# Patient Record
Sex: OTHER/UNKNOWN | Race: WHITE | Employment: FULL TIME | URBAN - METROPOLITAN AREA
[De-identification: names, ages, dates, MRNs, and addresses within clinical notes are randomized per-mention and may not be internally consistent; named-entity substitution may affect disease eponyms.]

---

## 2017-01-30 ENCOUNTER — OFFICE VISIT (OUTPATIENT)
Dept: RHEUMATOLOGY | Age: 32
End: 2017-01-30

## 2017-01-30 VITALS
OXYGEN SATURATION: 97 % | BODY MASS INDEX: 33.03 KG/M2 | SYSTOLIC BLOOD PRESSURE: 138 MMHG | HEIGHT: 73 IN | DIASTOLIC BLOOD PRESSURE: 79 MMHG | HEART RATE: 100 BPM | TEMPERATURE: 98.1 F | WEIGHT: 249.2 LBS

## 2017-01-30 DIAGNOSIS — D86.9 SARCOID: Primary | ICD-10-CM

## 2017-01-30 RX ORDER — MYCOPHENOLATE MOFETIL 500 MG/1
500 TABLET ORAL 2 TIMES DAILY
Qty: 60 TAB | Refills: 6 | Status: SHIPPED | OUTPATIENT
Start: 2017-01-30 | End: 2017-03-01

## 2017-01-30 NOTE — PROGRESS NOTES
RHEUMATOLOGY PROBLEM LIST AND CHIEF COMPLAINT  1. Sarcoidosis - lymph node biopsy showing nonnecrotizing granuloma, arthralgia, myalgia, chest pain, bone pain, hilar adenopathy, elevated ACE level, chronic abdominal pain with digestive issues. Therapy History:  Prior NSAIDs:   Prior DMARDs: methotrexate (AEs), hydroxychloroquine, ACTH (stopped 09/2016), Imuran, Remicade (7/13/2015 - 3/29/2016, ineffective)   Current NSAIDs:  Current DMARDs: Prednisone    INTERVAL HISTORY  This is a 33 y.o.  male. Today, the patient complains of pain in the joints and muscles. Severity:  4 on a scale of 0-10. Timing: all day or on awakening   Context/Associated signs and symptoms: The patient has restarted Acthar twice a week and feels \"decent. \" He complains of \"whole body pain,\" arthralgia, and occasional fatigue, but notes that myalgia, lower back pain, and chest pain has improved. He reports that every few weeks he will feel very fatigued and need to sleep for one day, but otherwise this has improved. He continues on prednisone 5 mg daily and denies seeing endocrinology. RHEUMATOLOGY REVIEW OF SYSTEMS   Positives as per history  Negatives as follows:  Rahel Mcer:  Denies unexplained persistent fevers or weight change  RESPIRATORY:  No pleuritic pain, exertional dyspnea  CARDIOVASCULAR:  Denies chest pain  GASTRO:   Denies heartburn, abdominal pain, nausea, vomiting, diarrhea  SKIN:    Denies rash   MSK:    No morning stiffness >1 hour    PAST MEDICAL HISTORY  Reviewed with patient, significant changes in medical history - no     PHYSICAL EXAM  Blood pressure 138/79, pulse 100, temperature 98.1 °F (36.7 °C), temperature source Oral, height 6' 1\" (1.854 m), weight 249 lb 3.2 oz (113 kg), SpO2 97 %. GENERAL APPEARANCE: Well-nourished, no acute distress  NECK: No adenopathy  ENT: No oral ulcers  CARDIOVASCULAR: Heart rhythm is regular. No murmur, rub, gallop  CHEST: Normal vesicular breath sounds.  No wheezes, rales, pleural friction rubs  ABDOMINAL: The abdomen is soft and nontender. Bowel sounds are normal  SKIN: No rash, palpable purpura, digital ulcer, abnormal thickening   MUSCULOSKELETAL:   Upper extremities - Right wrist minimal swelling and mild warmth  Lower extremities - full range of motion, no tenderness, no swelling, no synovial thickening and no deformity of joints      LABS, RADIOLOGY AND PROCEDURES  Previous labs reviewed -Yes    10/28/2016 Labs  ALT(86) - elevated  ESR(16) - mildly elevated  CBC, CMP, CRP, ACE - normal    10/28/2016 Chest x-ray  IMPRESSION:  No acute process. No interval change. ASSESSMENT  1. Sarcoidosis (Established problem -  Progressive disease) - the patient has restarted Acthar twice a week along with his prednisone 5 mg daily. He continues to have warmth and swelling in his right wrist, though there is improvement in the warmth. I explained that since Acthar has provided relief but we should start him on Cellcept so we can eventually stop prednisone and acthar. He should start Cellcept 500 mg daily since I am afraid he may have adverse effects. If tolerable he should then titrate his Cellcept to 500 mg BID. I explained that if Cellcept is effective in treating his wrist, we will begin to taper his Acthar. For now, he should continue Acthar twice a week and prednisone 5 mg daily. He should return in 2 months for a follow up. 2. New medication - Mycophenolate mofetil - A written summary, as prepared by the [employer] of Rheumatology was provided. The patient was given the opportunity to ask questions, and verbalized understanding of the following: The most common side effects reported were those associated with upset stomach, nausea, vomiting or diarrhea. Other possible side effects include headaches, dizziness, difficulty sleeping, tremor and rash. These side effects do not persist usually.   Less common include blood count abnormalities; reduction of white blood cells, red blood cells and platelets. Because of this, chances of infections, anemia, GI bleeding is increased. In transplant patients there was an increase risk of some cancers such as lymphoma and skin cancer. Regular blood tests are necessary for monitoring of possibly toxicity. Pregnancy is contraindicating when using this medication. Live vaccine should be avoided. Direct prolonged sun exposure should also be avoided. PLAN  1. Acthar 80 units twice a week  2. Continue prednisone 5mg daily  3. Start Cellcept 500 mg daily, titrate to 500 mg BID if tolerable  4. Return in 2 months    Bharati Remy MD, [address] [place] [address]   Adult and Pediatric Rheumatology     [facility] [address], Phone [phone], Fax [phone]     Visiting  of Pediatrics    Department of Pediatrics[address], Phone [phone], Fax [phone]    cc:  Santy Thomas MD    Written by stanislaw Church, as dictated by Bre Bernal.  Minda Remy M.D.

## 2017-01-30 NOTE — MR AVS SNAPSHOT
Visit Information     Date & Time Provider Department Dept. Phone Encounter #    1/30/2017  3:30 PM Roger Huber MD Arthritis and [address] [place] [address] [phone] [id number]      Follow-up Instructions     Return in about 2 months (around 3/30/2017). Your Appointments     2/10/2017  3:15 PM   ACUTE CARE with Miky Baez MD   [address])   Appt Note: for pain med. [address] Maintenance        Date Due    DTaP/Tdap/Td series (1 - Tdap) 3/14/2004      Allergies as of 1/30/2017  Review Complete On: 1/30/2017 By: Raymond Mendiola LPN    No Known Allergies      Current Immunizations  Reviewed on 3/29/2016    Name Date    Influenza Vaccine Intradermal PF 10/10/2014       Not reviewed this visit   You Were Diagnosed With        Codes Comments    [facility]    -  Primary ICD-10-CM: D86.9  ICD-9-CM: 135       Vitals     BP Pulse Temp Height(growth percentile) Weight(growth percentile) SpO2    138/79 (BP 1 Location: Right arm, BP Patient Position: Sitting) 100 98.1 °F (36.7 °C) (Oral) 6' 1\" (1.854 m) 249 lb 3.2 oz (113 kg) 97%    BMI Smoking Status                32.88 kg/m2 Never Smoker        Vitals History      BMI and BSA Data     Body Mass Index Body Surface Area    32.88 kg/m 2 2.41 m 2         Preferred Pharmacy       Pharmacy Name Phone    [facility]/PHARMACY [address], [address] [phone]         Your Updated Medication List          This list is accurate as of: 1/30/17  3:56 PM.  Always use your most recent med list.                Thomas Dawkins H.P. 80 unit/mL injectable gel   Generic drug:  corticotropin   INJECT 1 ML (80 UNITS) SUBCUTANEOUSLY TWICE PER WEEK. STORE REFRIGERATED. MULTI-DOSE VIAL. DISCARD UNUSED PORTION 28 DAYS AFTER INITIAL USE.        ALPRAZolam 0.5 mg tablet   Commonly known as:  XANAX   Take 1 Tab by mouth two (2) times daily as needed for Anxiety. B-D Syringe Luer-Patito 1cc 1 mL Syrg   Generic drug:  Syringe (Disposable)   USE AS DIRECTED       calcium 500 mg Tab   Take 1 tablet by mouth daily. chlorthalidone 25 mg tablet   Commonly known as:  HYGROTEN   Take 1 Tab by mouth daily. cholecalciferol 1,000 unit Cap   Commonly known as:  VITAMIN D3   TAKE ONE CAPSULE BY MOUTH EVERY DAY       Disposable Needles 25 x 5/8 \" Ndle   Generic drug:  Needle (Disp) 25 G   USE AS DIRECTED       mycophenolate 500 mg tablet   Commonly known as:  CELLCEPT   Take 1 Tab by mouth two (2) times a day for 30 days. * oxyCODONE ER 10 mg ER tablet   Commonly known as:  OxyCONTIN   Take 1 Tab by mouth every twelve (12) hours. Max Daily Amount: 20 mg.       * oxyCODONE 5 mg capsule   Commonly known as:  OXYIR   Take 1 Cap by mouth every four (4) hours as needed. Max Daily Amount: 30 mg.       pantoprazole 40 mg tablet   Commonly known as:  PROTONIX   Take 1 Tab by mouth daily. predniSONE 5 mg tablet   Commonly known as:  DELTASONE   take 7.5 tablets by mouth daily       rOPINIRole 1 mg tablet   Commonly known as:  REQUIP   Take 1 Tab by mouth nightly. * Notice: This list has 2 medication(s) that are the same as other medications prescribed for you. Read the directions carefully, and ask your doctor or other care provider to review them with you. Prescriptions Sent to Pharmacy        Refills    mycophenolate (CELLCEPT) 500 mg tablet 6    Sig: Take 1 Tab by mouth two (2) times a day for 30 days. Class: Normal    Pharmacy: [facility]/pharmacy [id number] Ph #: [phone]    Route: Oral      Follow-up Instructions     Return in about 2 months (around 3/30/2017). Introducing [facility]!      Vahe Lieberman introduces Audioscribe patient portal. Now you can access parts of your medical record, email your doctor's office, and request medication refills online. 1. In your internet browser, go to [email or web address]   2. Click on the First Time User? Click Here link in the Sign In box. You will see the New Member Sign Up page. 3. Enter your So[employer]ets Access Code exactly as it appears below. You will not need to use this code after youve completed the sign-up process. If you do not sign up before the expiration date, you must request a new code. · Soapets Access Code: [email or web address]  Expires: 4/30/2017  3:56 PM    4. Enter the last four digits of your Social Security Number (xxxx) and Date of Birth (mm/dd/yyyy) as indicated and click Submit. You will be taken to the next sign-up page. 5. Create a Soapets ID. This will be your Soapets login ID and cannot be changed, so think of one that is secure and easy to remember. 6. Create a Soapets password. You can change your password at any time. 7. Enter your Password Reset Question and Answer. This can be used at a later time if you forget your password. 8. Enter your e-mail address. You will receive e-mail notification when new information is available in [address] 9. Click Sign Up. You can now view and download portions of your medical record. 10. Click the Download Summary menu link to download a portable copy of your medical information. If you have questions, please visit the Frequently Asked Questions section of the So[employer]ets website. Remember, So[employer]ets is NOT to be used for urgent needs. For medical emergencies, dial 911. Now available from your iPhone and Android! Please provide this summary of care documentation to your next provider. Your primary care clinician is listed as Selene Qiu. If you have any questions after today's visit, please call [phone].

## 2017-02-10 ENCOUNTER — HOSPITAL ENCOUNTER (EMERGENCY)
Age: 32
Discharge: HOME | End: 2017-02-10
Attending: EMERGENCY MEDICINE
Payer: COMMERCIAL

## 2017-02-10 ENCOUNTER — APPOINTMENT (OUTPATIENT)
Dept: GENERAL RADIOLOGY | Age: 32
End: 2017-02-10
Attending: EMERGENCY MEDICINE
Payer: COMMERCIAL

## 2017-02-10 VITALS
OXYGEN SATURATION: 96 % | HEART RATE: 83 BPM | RESPIRATION RATE: 18 BRPM | WEIGHT: 250 LBS | HEIGHT: 72 IN | SYSTOLIC BLOOD PRESSURE: 139 MMHG | BODY MASS INDEX: 33.86 KG/M2 | DIASTOLIC BLOOD PRESSURE: 53 MMHG | TEMPERATURE: 98.3 F

## 2017-02-10 DIAGNOSIS — F43.21 GRIEF REACTION: ICD-10-CM

## 2017-02-10 DIAGNOSIS — F41.1 ANXIETY STATE: Primary | ICD-10-CM

## 2017-02-10 LAB
ALBUMIN SERPL BCP-MCNC: 4.6 G/DL (ref 3.5–5)
ALBUMIN/GLOB SERPL: 1.4 {RATIO} (ref 1.1–2.2)
ALP SERPL-CCNC: 52 U/L (ref 45–117)
ALT SERPL-CCNC: 113 U/L (ref 12–78)
ANION GAP BLD CALC-SCNC: 10 MMOL/L (ref 5–15)
AST SERPL W P-5'-P-CCNC: 36 U/L (ref 15–37)
BASOPHILS # BLD AUTO: 0.1 K/UL (ref 0–0.1)
BASOPHILS # BLD: 1 % (ref 0–1)
BILIRUB SERPL-MCNC: 0.5 MG/DL (ref 0.2–1)
BUN SERPL-MCNC: 16 MG/DL (ref 6–20)
BUN/CREAT SERPL: 14 (ref 12–20)
CALCIUM SERPL-MCNC: 9.5 MG/DL (ref 8.5–10.1)
CHLORIDE SERPL-SCNC: 104 MMOL/L (ref 97–108)
CO2 SERPL-SCNC: 26 MMOL/L (ref 21–32)
CREAT SERPL-MCNC: 1.17 MG/DL (ref 0.7–1.3)
EOSINOPHIL # BLD: 0.2 K/UL (ref 0–0.4)
EOSINOPHIL NFR BLD: 3 % (ref 0–7)
ERYTHROCYTE [DISTWIDTH] IN BLOOD BY AUTOMATED COUNT: 12.2 % (ref 11.5–14.5)
GLOBULIN SER CALC-MCNC: 3.4 G/DL (ref 2–4)
GLUCOSE SERPL-MCNC: 99 MG/DL (ref 65–100)
HCT VFR BLD AUTO: 44.9 % (ref 36.6–50.3)
HGB BLD-MCNC: 16.2 G/DL (ref 12.1–17)
LYMPHOCYTES # BLD AUTO: 31 % (ref 12–49)
LYMPHOCYTES # BLD: 2.3 K/UL (ref 0.8–3.5)
MCH RBC QN AUTO: 31.6 PG (ref 26–34)
MCHC RBC AUTO-ENTMCNC: 36.1 G/DL (ref 30–36.5)
MCV RBC AUTO: 87.5 FL (ref 80–99)
MONOCYTES # BLD: 0.7 K/UL (ref 0–1)
MONOCYTES NFR BLD AUTO: 9 % (ref 5–13)
NEUTS SEG # BLD: 4.1 K/UL (ref 1.8–8)
NEUTS SEG NFR BLD AUTO: 56 % (ref 32–75)
PLATELET # BLD AUTO: 188 K/UL (ref 150–400)
POTASSIUM SERPL-SCNC: 3.4 MMOL/L (ref 3.5–5.1)
PROT SERPL-MCNC: 8 G/DL (ref 6.4–8.2)
RBC # BLD AUTO: 5.13 M/UL (ref 4.1–5.7)
SODIUM SERPL-SCNC: 140 MMOL/L (ref 136–145)
TROPONIN I SERPL-MCNC: <0.04 NG/ML
WBC # BLD AUTO: 7.3 K/UL (ref 4.1–11.1)

## 2017-02-10 PROCEDURE — 36415 COLL VENOUS BLD VENIPUNCTURE: CPT | Performed by: EMERGENCY MEDICINE

## 2017-02-10 PROCEDURE — 99284 EMERGENCY DEPT VISIT MOD MDM: CPT

## 2017-02-10 PROCEDURE — 85025 COMPLETE CBC W/AUTO DIFF WBC: CPT | Performed by: EMERGENCY MEDICINE

## 2017-02-10 PROCEDURE — 84484 ASSAY OF TROPONIN QUANT: CPT | Performed by: EMERGENCY MEDICINE

## 2017-02-10 PROCEDURE — 93005 ELECTROCARDIOGRAM TRACING: CPT

## 2017-02-10 PROCEDURE — 80053 COMPREHEN METABOLIC PANEL: CPT | Performed by: EMERGENCY MEDICINE

## 2017-02-10 PROCEDURE — 71020 XR CHEST PA LAT: CPT

## 2017-02-10 RX ORDER — ALPRAZOLAM 0.5 MG/1
0.5 TABLET ORAL
Qty: 10 TAB | Refills: 0 | Status: SHIPPED | OUTPATIENT
Start: 2017-02-10 | End: 2019-05-20

## 2017-02-11 LAB
ATRIAL RATE: 73 BPM
CALCULATED P AXIS, ECG09: 20 DEGREES
CALCULATED R AXIS, ECG10: 14 DEGREES
CALCULATED T AXIS, ECG11: 25 DEGREES
DIAGNOSIS, 93000: NORMAL
P-R INTERVAL, ECG05: 138 MS
Q-T INTERVAL, ECG07: 404 MS
QRS DURATION, ECG06: 98 MS
QTC CALCULATION (BEZET), ECG08: 445 MS
VENTRICULAR RATE, ECG03: 73 BPM

## 2017-02-11 NOTE — ED PROVIDER NOTES
HPI Comments: Graciela Le is a 33 y.o. male with a history of hypertension, sarcoidosis, anxiety, and depression who presents via EMS to [facility] ED with a chief complaint of anxiety with onset immediately prior to arrival in the ED, with patient reporting he \"lost composure\" while driving due to the sudden death of his wife last night. Patient states he has been with his family since last night until this afternoon, at which time he decided to drive to his parent's house but was unable to handle the drive due to his emotional state. Patient reports pulling over and calling a friend, at which time he began to feel lightheaded with chest heaviness and generalized numbness over his whole body. Patient states he has not needed medication for anxiety or depression in a number of years, but is still taking pain medications for sarcoidosis. Patient denies any current symptoms, reporting he just feels \"extreme sadness\" over the death of his wife. Patient denies any suicidal or homicidal ideations, abdominal pain, nausea, vomiting, diarrhea, cough, or any other symptoms at this time. PCP: Robert Yates MD    There are no other complaints, changes or physical findings at this time. The history is provided by the patient. Past Medical History:   Diagnosis Date    Depression     Essential hypertension     Long-term current use of steroids     [facility] ([facility]        History reviewed. No pertinent past surgical history. Family History:   Problem Relation Age of Onset    Cancer Father      testicular       Social History     Social History    Marital status:      Spouse name: N/A    Number of children: N/A    Years of education: N/A     Occupational History    Not on file.      Social History Main Topics    Smoking status: Never Smoker    Smokeless tobacco: Never Used    Alcohol use No    Drug use: No    Sexual activity: Yes     Partners: Female     Birth control/ protection: None Other Topics Concern    Not on file     Social History Narrative    Is a , works with [employer]. Lives with his wife and their 10year old daughter. ALLERGIES: Review of patient's allergies indicates no known allergies. Review of Systems   Constitutional: Negative. Negative for chills, fatigue and fever. HENT: Negative. Negative for congestion, rhinorrhea and sore throat. Eyes: Negative. Negative for pain, discharge and visual disturbance. Respiratory: Negative. Negative for cough, chest tightness, shortness of breath and wheezing. Cardiovascular: Positive for chest pain (heaviness). Negative for palpitations and leg swelling. Gastrointestinal: Negative. Negative for abdominal pain, constipation, diarrhea, nausea and vomiting. Endocrine: Negative. Genitourinary: Negative. Negative for dysuria, frequency and hematuria. Musculoskeletal: Negative. Negative for arthralgias, back pain and myalgias. Skin: Negative. Negative for rash. Allergic/Immunologic: Negative. Neurological: Positive for light-headedness and numbness. Negative for dizziness, weakness and headaches. Hematological: Negative. Psychiatric/Behavioral: Negative for suicidal ideas (or homicidal). The patient is nervous/anxious. All other systems reviewed and are negative. Patient Vitals for the past 12 hrs:   Temp Pulse Resp BP SpO2   02/10/17 2100 - - - 139/53 96 %   02/10/17 2000 - - - 133/76 99 %   02/10/17 1913 98.3 °F (36.8 °C) 83 18 (!) 137/91 93 %      Physical Exam   Constitutional: He is oriented to person, place, and time. He appears well-developed and well-nourished. No distress. HENT:   Head: Normocephalic and atraumatic. Eyes: EOM are normal. Right eye exhibits no discharge. Left eye exhibits no discharge. No scleral icterus. Neck: Normal range of motion. Neck supple. No tracheal deviation present.    Cardiovascular: Normal rate, regular rhythm, normal heart sounds and intact distal pulses. Exam reveals no gallop and no friction rub. No murmur heard. Pulmonary/Chest: Effort normal and breath sounds normal. No respiratory distress. He has no wheezes. He has no rales. He exhibits tenderness (anterior). Abdominal: Soft. He exhibits no distension. There is no tenderness. Musculoskeletal: Normal range of motion. He exhibits no edema. Lymphadenopathy:     He has no cervical adenopathy. Neurological: He is alert and oriented to person, place, and time. Skin: Skin is warm and dry. No rash noted. Psychiatric: His mood appears anxious. Tearful   Nursing note and vitals reviewed. MDM  Number of Diagnoses or Management Options  Anxiety state:   Grief reaction:   Diagnosis management comments:     Patient presents to ED with lightheadedness, generalized numbness and chest pressure likely secondary to anxiety and panic attack associated with sudden death of wife last night. Patient denies SI/HI. Chest wall pain is reproducible on exam and likely MSK in nature. Doubt ACS. Will check CBC, CMP, Stephanie and CXR.  paged to provide emotional support. Amount and/or Complexity of Data Reviewed  Clinical lab tests: ordered and reviewed  Tests in the radiology section of CPT®: ordered and reviewed  Tests in the medicine section of CPT®: ordered and reviewed  Review and summarize past medical records: yes  Independent visualization of images, tracings, or specimens: yes    Patient Progress  Patient progress: stable      Procedures     EKG interpretation: (Preliminary) 19:43  Rhythm: normal sinus rhythm; and regular. Rate (approx.): 73; Axis: normal; WI interval: normal; QRS interval: normal; ST/T wave: normal.    Progress Note:  8:58 PM  Patient reevaluated. He has no complaints at this time and continues to deny SI/HI. Parents are at bedside and will be taking patient home with them. Patient and parents are comfortable with plan for discharge. Discussed results, prescriptions and follow up plan with patient. Provided customary return to ED instructions. Patient expressed understanding. Tori Sebastian MD    LABORATORY TESTS:  Recent Results (from the past 12 hour(s))   CBC WITH AUTOMATED DIFF    Collection Time: 02/10/17  7:53 PM   Result Value Ref Range    WBC 7.3 4.1 - 11.1 K/uL    RBC 5.13 4.10 - 5.70 M/uL    HGB 16.2 12.1 - 17.0 g/dL    HCT 44.9 36.6 - 50.3 %    MCV 87.5 80.0 - 99.0 FL    MCH 31.6 26.0 - 34.0 PG    MCHC 36.1 30.0 - 36.5 g/dL    RDW 12.2 11.5 - 14.5 %    PLATELET 073 130 - 606 K/uL    NEUTROPHILS 56 32 - 75 %    LYMPHOCYTES 31 12 - 49 %    MONOCYTES 9 5 - 13 %    EOSINOPHILS 3 0 - 7 %    BASOPHILS 1 0 - 1 %    ABS. NEUTROPHILS 4.1 1.8 - 8.0 K/UL    ABS. LYMPHOCYTES 2.3 0.8 - 3.5 K/UL    ABS. MONOCYTES 0.7 0.0 - 1.0 K/UL    ABS. EOSINOPHILS 0.2 0.0 - 0.4 K/UL    ABS. BASOPHILS 0.1 0.0 - 0.1 K/UL   METABOLIC PANEL, COMPREHENSIVE    Collection Time: 02/10/17  7:53 PM   Result Value Ref Range    Sodium 140 136 - 145 mmol/L    Potassium 3.4 (L) 3.5 - 5.1 mmol/L    Chloride 104 97 - 108 mmol/L    CO2 26 21 - 32 mmol/L    Anion gap 10 5 - 15 mmol/L    Glucose 99 65 - 100 mg/dL    BUN 16 6 - 20 MG/DL    Creatinine 1.17 0.70 - 1.30 MG/DL    BUN/Creatinine ratio 14 12 - 20      GFR est AA >60 >60 ml/min/1.73m2    GFR est non-AA >60 >60 ml/min/1.73m2    Calcium 9.5 8.5 - 10.1 MG/DL    Bilirubin, total 0.5 0.2 - 1.0 MG/DL    ALT (SGPT) 113 (H) 12 - 78 U/L    AST (SGOT) 36 15 - 37 U/L    Alk. phosphatase 52 45 - 117 U/L    Protein, total 8.0 6.4 - 8.2 g/dL    Albumin 4.6 3.5 - 5.0 g/dL    Globulin 3.4 2.0 - 4.0 g/dL    A-G Ratio 1.4 1.1 - 2.2     TROPONIN I    Collection Time: 02/10/17  7:53 PM   Result Value Ref Range    Troponin-I, Qt. <0.04 <0.05 ng/mL       IMAGING RESULTS:  XR CHEST PA LAT   Final Result     INDICATION: . chest pain  COMPARISON: Previous chest xray, 10/28/2016. Candance Huger   FINDINGS: PA and lateral view of the chest. .  Lines/tubes/surgical: None. Heart/mediastinum: Unremarkable. Lungs/pleura: No focal consolidation or mass. No visualized pleural effusion or  pneumothorax. Additional Comments: None. Dina Huber IMPRESSION  IMPRESSION:  1. No radiographic evidence of acute cardiopulmonary disease. IMPRESSION:  1. Anxiety state    2. Grief reaction        PLAN:  1. Current Discharge Medication List      CONTINUE these medications which have CHANGED    Details   ALPRAZolam (XANAX) 0.5 mg tablet Take 1 Tab by mouth every eight (8) hours as needed for Anxiety. Max Daily Amount: 1.5 mg.  Qty: 10 Tab, Refills: 0         CONTINUE these medications which have NOT CHANGED    Details   mycophenolate (CELLCEPT) 500 mg tablet Take 1 Tab by mouth two (2) times a day for 30 days. Qty: 60 Tab, Refills: 6      oxyCODONE ER (OXYCONTIN) 10 mg ER tablet Take 1 Tab by mouth every twelve (12) hours. Max Daily Amount: 20 mg.  Qty: 60 Tab, Refills: 0    Associated Diagnoses: Sarcoidosis (HCC)      oxyCODONE (OXYIR) 5 mg capsule Take 1 Cap by mouth every four (4) hours as needed. Max Daily Amount: 30 mg.  Qty: 90 Cap, Refills: 0    Associated Diagnoses: Sarcoidosis (HCC)      rOPINIRole (REQUIP) 1 mg tablet Take 1 Tab by mouth nightly. Qty: 30 Tab, Refills: 11      predniSONE (DELTASONE) 5 mg tablet take 7.5 tablets by mouth daily   Qty: 225 Tab, Refills: 1      Cholecalciferol, Vitamin D3, 1,000 unit cap TAKE ONE CAPSULE BY MOUTH EVERY DAY  Qty: 90 Cap, Refills: 11      DISPOSABLE NEEDLES 25 x 5/8 \" ndle USE AS DIRECTED  Qty: 4 Each, Refills: 6      B-D SYRINGE LUER-SAVAGE 1CC 1 mL syrg USE AS DIRECTED  Qty: 4 Syringe, Refills: 6      ACTHAR H.P. 80 unit/mL injectable gel INJECT 1 ML (80 UNITS) SUBCUTANEOUSLY TWICE PER WEEK. STORE REFRIGERATED. MULTI-DOSE VIAL. DISCARD UNUSED PORTION 28 DAYS AFTER INITIAL USE. Qty: 5 Vial, Refills: 0      chlorthalidone (HYGROTEN) 25 mg tablet Take 1 Tab by mouth daily.   Qty: 90 Tab, Refills: 3    Associated Diagnoses: Hypertension secondary to drug      calcium 500 mg tab Take 1 tablet by mouth daily. Qty: 90 tablet, Refills: 3    Associated Diagnoses: Chronic use of steroids           2. Follow-up Information     Follow up With Details Comments [id number] Ladarius Sanchez MD In 2 days  [place]  [address]  [phone]      [facility] EMERGENCY DEPT  As needed, If symptoms worsen [address]  [address] [facility]  [phone]        Return to ED if worse     Discharge Note:  9:05 PM  The patient is ready for discharge. The patient's signs, symptoms, diagnosis, and discharge instructions have been discussed and the patient has conveyed their understanding. The patient is to follow up as recommended or return to the ER should their symptoms worsen. Plan has been discussed and the patient is in agreement. This note is prepared by Hilario Lovell, acting as Scribe for Petr Barbour MD.    Petr Barbour MD: The scribe's documentation has been prepared under my direction and personally reviewed by me in its entirety. I confirm that the note above accurately reflects all work, treatment, procedures, and medical decision making performed by me.

## 2017-02-11 NOTE — DISCHARGE INSTRUCTIONS
Grief (Actual/Anticipated): Care Instructions  Your Care Instructions  Grief is your emotional reaction to a major loss. The words \"sorrow\" and \"heartache\" often are used to describe feelings of grief. You feel grief when you lose a beloved person, pet, place, or thing. It is also natural to feel grief when you lose a valued way of life, such as a job, marriage, or good health. You may begin to grieve before a loss occurs. You may grieve for a loved one who is sick and dying. Children and adults often feel the pain of loss before a big move or divorce. This type of grief helps you get ready for a loss. Grief is different for each person. There is no \"normal\" or \"expected\" period of time for grieving. Some people adjust to their loss within a couple of months. Others may take 2 years or longer, especially if their lives were changed a lot or if the loss was sudden and shocking. Grieving can cause problems such as headaches, loss of appetite, and trouble with thinking or sleeping. You may withdraw from friends and family and behave in ways that are unusual for you. Grief may cause you to question your beliefs and views about life. Grief is natural and does not require medical treatment. But if you have trouble sleeping, it may help to take sleeping pills for a short time. It may help to talk with people who have been through or are going through similar losses. You may also want to talk to a counselor about your feelings. Talking about your loss, sharing your cares and concerns, and getting support from others are important parts of healthy grieving. Follow-up care is a key part of your treatment and safety. Be sure to make and go to all appointments, and call your doctor if you are having problems. Its also a good idea to know your test results and keep a list of the medicines you take. How can you care for yourself at home? · Get enough sleep. Your mind helps make sense of your life while you sleep. Missing sleep can lead to illness and make it harder for you to deal with your grief. · Eat healthy foods. Try to avoid eating only foods that give you comfort. Ask someone to join you for a meal if you do not like eating alone. Consider taking a multivitamin every day. · Get some exercise every day. Even a walk can help you deal with your grief. Other exercises, such as yoga, can also help you manage stress. · Comfort yourself. Take time to look at photos or use special items that make you feel better. · Stay involved in your life. Do not withdraw from the activities you enjoy. People you know at work, Shinto, clubs, or other groups can help you get through your period of grief. · Think about joining a support group to help you deal with your grief. There are many support groups to help people recover from grief. When should you call for help? Be sure to contact your doctor if:  · You feel that life is meaningless, or you think about killing yourself. · A grieving person you know talks about hurting himself or herself. · You have any of the following problems that last for 2 or more weeks:  ¨ You feel sad a lot or cry all the time. ¨ You have trouble sleeping, or you sleep too much. ¨ You find it hard to concentrate, make decisions, or remember things. ¨ You change how you normally eat. ¨ You feel guilty about the death or loss you have suffered. ¨ You are using alcohol or drugs to help you cope with your loss. Where can you learn more? Go to http://terrell-sharron.info/. Enter H249 in the search box to learn more about \"Grief (Actual/Anticipated): Care Instructions. \"  Current as of: February 24, 2016  Content Version: 11.1  © 9956-9043 [employer]. Care instructions adapted under license by [employer] (which disclaims liability or warranty for this information).  If you have questions about a medical condition or this instruction, always ask your healthcare professional. [address] any warranty or liability for your use of this information.

## 2017-02-11 NOTE — ED NOTES
Assumed care of patient. Patient arrives with complaints of anxiety. Patient states that his wife last night and today he was driving home and suddenly \"lost composure\". Patient describes symptoms as chest heaviness, feeling like he couldn't breathe and numbness to mouth and extremities. Patient has hx of anxiety but he feels it was under control until recently. Upon arrival to ED, patient tearful, denies plans of hurting himself. VS stable, patient placed on the monitor x 2 with side rails and call bell in reach.

## 2017-02-11 NOTE — ED NOTES
Dr. Agusto Loya has reviewed discharge instructions with the patient. The patient verbalized understanding. Patient ambulatory to car with family, appears in NAD.

## 2017-02-11 NOTE — PROGRESS NOTES
Spiritual Care Assessment/Progress Notes    Farideh Walton [id number]  [id number]    1985  33 y.o.  male    Patient Telephone Number: [phone] (home)   Episcopal Affiliation: No Nondenominational   Language: English   Extended Emergency Contact Information  Primary Emergency Contact: Scar Garza Chbil Phone: [phone]  Relation: Spouse   Patient Active Problem List    Diagnosis Date Noted    Hypertension secondary to drug 08/28/2015    Anxiety 07/22/2015    Long term use of systemic steroids 07/22/2015    Sarcoidosis ([facility] [address] 05/08/2014    Depression 05/08/2014        Date: 2/10/2017       Level of Episcopal/Spiritual Activity:  []         Involved in husam tradition/spiritual practice    [x]         Not involved in husam tradition/spiritual practice  [x]         Spiritually oriented    []         Claims no spiritual orientation    []         seeking spiritual identity  []         Feels alienated from Yarsanism practice/tradition  []         Feels angry about Yarsanism practice/tradition  []         Spirituality/Yarsanism tradition /IS NOT a resource for coping at this time.   []         Not able to assess due to medical condition    Services Provided Today:  [x]         crisis intervention    []         reading Scriptures  [x]         spiritual assessment    [x]         prayer  [x]         empathic listening/emotional support  []         rites and rituals (cite in comments)  []         life review     []         Yarsanism support  []         theological development   []         advocacy  []         ethical dialog     []         blessing  []         bereavement support    []         support to family  []         anticipatory grief support   []         help with AMD  []         spiritual guidance    []         meditation      Spiritual Care Needs  [x]         Emotional Support  [x]         Spiritual/Episcopal Care  [x]         Loss/Adjustment  [] Advocacy/Referral                /Ethics  [x]         No needs expressed at               this time  []         Other: (note in               comments)  Spiritual Care Plan  []         Follow up visits with               pt/family  []         Provide materials  []         Schedule sacraments  []         Contact Community               Clergy  []         Follow up as needed  []         Other: (note in               comments)     Comments: Patients wife past away unexpectedly yesterday and he is grieving. He has a [de-identified] year old daughter and support from his parents. I prayed with the patient and saught strength through this time. [address] [facility]  PRN    paging service [phone])

## 2017-04-28 RX ORDER — ROPINIROLE 1 MG/1
1 TABLET, FILM COATED ORAL
Qty: 30 TAB | Refills: 11 | Status: SHIPPED | OUTPATIENT
Start: 2017-04-28 | End: 2017-05-01 | Stop reason: SDUPTHER

## 2017-04-28 NOTE — TELEPHONE ENCOUNTER
Patient states prescription is at [facility] and base insurance [facility] and need 90 day supply so need new prescription sent to [facility]

## 2017-05-01 RX ORDER — ROPINIROLE 1 MG/1
1 TABLET, FILM COATED ORAL
Qty: 90 TAB | Refills: 3 | Status: SHIPPED | OUTPATIENT
Start: 2017-05-01 | End: 2019-05-20

## 2019-05-20 ENCOUNTER — OFFICE VISIT (OUTPATIENT)
Dept: INTERNAL MEDICINE CLINIC | Age: 34
End: 2019-05-20

## 2019-05-20 VITALS
RESPIRATION RATE: 16 BRPM | SYSTOLIC BLOOD PRESSURE: 122 MMHG | BODY MASS INDEX: 26.11 KG/M2 | WEIGHT: 192.8 LBS | TEMPERATURE: 98 F | HEART RATE: 75 BPM | OXYGEN SATURATION: 98 % | HEIGHT: 72 IN | DIASTOLIC BLOOD PRESSURE: 79 MMHG

## 2019-05-20 DIAGNOSIS — K21.9 GASTROESOPHAGEAL REFLUX DISEASE, ESOPHAGITIS PRESENCE NOT SPECIFIED: ICD-10-CM

## 2019-05-20 DIAGNOSIS — F43.23 ADJUSTMENT DISORDER WITH MIXED ANXIETY AND DEPRESSED MOOD: ICD-10-CM

## 2019-05-20 DIAGNOSIS — R10.10 UPPER ABDOMINAL PAIN: ICD-10-CM

## 2019-05-20 DIAGNOSIS — R63.4 WEIGHT LOSS, UNINTENTIONAL: Primary | ICD-10-CM

## 2019-05-20 PROBLEM — F33.1 DEPRESSION, MAJOR, RECURRENT, MODERATE (HCC): Status: ACTIVE | Noted: 2019-05-20

## 2019-05-20 NOTE — PROGRESS NOTES
Chief Complaint   Patient presents with    [facility]    Weight Loss     dietary concerns    Depression         3 most recent PHQ Screens 5/20/2019   Little interest or pleasure in doing things More than half the days   Feeling down, depressed, irritable, or hopeless More than half the days   Total Score PHQ 2 4   Trouble falling or staying asleep, or sleeping too much More than half the days   Feeling tired or having little energy Nearly every day   Poor appetite, weight loss, or overeating Nearly every day   Feeling bad about yourself - or that you are a failure or have let yourself or your family down More than half the days   Trouble concentrating on things such as school, work, reading, or watching TV Nearly every day   Moving or speaking so slowly that other people could have noticed; or the opposite being so fidgety that others notice More than half the days   Thoughts of being better off dead, or hurting yourself in some way Not at all   PHQ 9 Score 19   How difficult have these problems made it for you to do your work, take care of your home and get along with others Extremely difficult     Provider Melody Wise-DON informed of pt's positive depression screening.

## 2019-05-20 NOTE — PROGRESS NOTES
Malik Lawson is a 37 y.o. male and presents with Establish Care; Weight Loss (dietary concerns); and Depression    Subjective:  Pt here to establish care with this provider, former patient of Dr. Alejo Davidson. Here with concern for eating. Recalls episodes of passing/blacking out with ingestion of certain foods, breads often. Reports having uncomfortable urge to lie down and then sleeps very hard, awakening dazed and confused. Denies h/o migraine, seizures, gastroparesis, or celiac reported. Associated with upper abdominal pain, nausea, and diarrhea; with intermittent blood. Since onset a few years ago when dealing with sarcoid of lymph nodes. Occurring 2-3 times weekly for the past 1 year. Having weakness and avoiding food lately, now with wt loss. Followed by Dr. Radha Butler in past for sarcoid, but fell out of care 2 years ago after wife passed. Patient is seen for new onset of depression symptoms. No treatments currently. No psychiatry or psychotherapy care. The patient denies recurrent thoughts of death and suicidal thoughts without plan.    The patient experiences the following side effects from the treatment: n/a.  3 most recent PHQ Screens 5/20/2019   Little interest or pleasure in doing things More than half the days   Feeling down, depressed, irritable, or hopeless More than half the days   Total Score PHQ 2 4   Trouble falling or staying asleep, or sleeping too much More than half the days   Feeling tired or having little energy Nearly every day   Poor appetite, weight loss, or overeating Nearly every day   Feeling bad about yourself - or that you are a failure or have let yourself or your family down More than half the days   Trouble concentrating on things such as school, work, reading, or watching TV Nearly every day   Moving or speaking so slowly that other people could have noticed; or the opposite being so fidgety that others notice More than half the days   Thoughts of being better off dead, or hurting yourself in some way Not at all   PHQ 9 Score 19   How difficult have these problems made it for you to do your work, take care of your home and get along with others Extremely difficult       Review of Systems  Constitutional: negative for fevers, chills, anorexia and weight loss  Respiratory:  negative for cough, hemoptysis, dyspnea, and wheezing  CV:   negative for chest pain, palpitations, and lower extremity edema  GI:   negative for melena  Endo:               negative for polyuria,polydipsia,polyphagia, and heat intolerance  Genitourinary: negative for frequency, urgency, dysuria, retention, and hematuria  Integument:  negative for rash, ulcerations, and pruritus  Hematologic:  negative for easy bruising and bleeding  Musculoskel: negative for arthralgias, muscle weakness,and joint pain/swelling  Neurological:  negative for headaches, dizziness, vertigo,and memory/gait problems  Behavl/Psych: negative for feelings of suicide    Past Medical History:   Diagnosis Date    Depression     Essential hypertension     Long-term current use of steroids     Sarcoidosis      History reviewed. No pertinent surgical history. Social History     Socioeconomic History    Marital status:      Spouse name: Not on file    Number of children: Not on file    Years of education: Not on file    Highest education level: Not on file   Tobacco Use    Smoking status: Never Smoker    Smokeless tobacco: Never Used   Substance and Sexual Activity    Alcohol use: No    Drug use: No    Sexual activity: Yes     Partners: Female     Birth control/protection: None   Social History Narrative    Is a , works with [employer]. Lives with his wife and their 10year old daughter.        Family History   Problem Relation Age of Onset   Shiva Cancer Father         testicular       No Known Allergies    Objective:  Visit Vitals  /79 (BP 1 Location: Left arm, BP Patient Position: Sitting)   Pulse 75   Temp 98 °F (36.7 °C)   Resp 16   Ht 6' (1.829 m)   Wt 192 lb 12.8 oz (87.5 kg)   SpO2 98%   BMI 26.15 kg/m²     Wt Readings from Last 3 Encounters:   05/20/19 192 lb 12.8 oz (87.5 kg)   02/10/17 250 lb (113.4 kg)   01/30/17 249 lb 3.2 oz (113 kg)     Physical Exam:   General appearance - alert, well appearing, and in no distress. Mental status - A/O x 4, anxious mood and affect. +restless  Neck -Supple ,normal CSP. FROM, non-tender. No significant adenopathy/thyromegaly. No JVD. Chest - CTA. Symmetric chest rise. No wheezing, rales or rhonchi. Heart - Normal rate, regular rhythm. Normal S1, S2. No MGR or clicks. Abdomen - Soft,non-distended. Hyperactive BS in all quadrants. NT, no mass or HSM. Ext- Radial, DP pulses, 2+ bilaterally. No pedal edema, clubbing, or cyanosis. Skin-Warm and dry. No hyperpigmentation, ulcerations, or suspicious lesions. Neuro - Normal speech, no focal findings or movement disorder. Normal strength, gait, and muscle tone. Assessment/Plan:  Baseline labs ordered. Referred to GI. Agreed with pt today to HOLD on med start for mood disorder pending GI f/u. No meds tried since pt has exhausted all for GERD AND Gas already. Advised to DRINK 2-3 protein shakes DAILY to help slow down weight loss, encouraged to [address] and try to eat along IBS/Gluten-Free diet. Medication Side Effects and Warnings were discussed with patient: yes   Patient Labs were reviewed: yes  Patient Past Records were reviewed: yes    See below for other orders   Follow-up and Dispositions    · Return in about 1 month (around 6/17/2019) for Mood, abd pain, lab review. ICD-10-CM ICD-9-CM    1. Weight loss, unintentional R63.4 783.21 REFERRAL TO GASTROENTEROLOGY      HEMOGLOBIN A1C WITH EAG      TSH 3RD GENERATION   2. Upper abdominal pain R10.10 789.09 REFERRAL TO GASTROENTEROLOGY      CBC WITH AUTOMATED DIFF   3.  Gastroesophageal reflux disease, esophagitis presence not specified K21.9 530.81 REFERRAL TO GASTROENTEROLOGY      METABOLIC PANEL, COMPREHENSIVE   4. Adjustment disorder with mixed anxiety and depressed mood F43.23 309.28      Orders Placed This Encounter    METABOLIC PANEL, COMPREHENSIVE    CBC WITH AUTOMATED DIFF    HEMOGLOBIN A1C WITH EAG    TSH 3RD GENERATION    [facility]     Referral Priority:   Routine     Referral Type:   Consultation     Referral Reason:   Specialty Services Required     Referral Location:   [facility]     Referred to Provider:   Hope Oliva MD     Number of Visits Requested:   1       Chris Taylor expressed understanding of plan. An After Visit Summary was offered/printed and given to the patient.

## 2019-05-20 NOTE — PATIENT INSTRUCTIONS
Diet for Irritable Bowel Syndrome: Care Instructions  Your Care Instructions    Irritable bowel syndrome, or IBS, is a problem with the intestines. IBS can cause belly pain, bloating, gas, constipation, and diarrhea. Most people can control their symptoms by changing their diet and easing stress. No specific foods cause everyone with IBS to have symptoms. Doctors don't offer a specific diet to manage symptoms. But many people find that they feel better when they stop eating certain foods. A high-fiber diet may help if you have constipation. Follow-up care is a key part of your treatment and safety. Be sure to make and go to all appointments, and call your doctor if you are having problems. It's also a good idea to know your test results and keep a list of the medicines you take. How can you care for yourself at home? To reduce constipation  · Include fruits, vegetables, beans, and whole grains in your diet each day. These foods are high in fiber. Slowly increase the amount of fiber you eat. This helps you avoid a lot of gas. · Drink plenty of fluids, enough so that your urine is light yellow or clear like water. If you have kidney, heart, or liver disease and have to limit fluids, talk with your doctor before you increase the amount of fluids you drink. · Get some exercise every day. Build up slowly to 30 to 60 minutes a day on 5 or more days of the week. · Take a fiber supplement, such as Citrucel or Metamucil, every day if needed. Read and follow all instructions on the label. · Schedule time each day for a bowel movement. Having a daily routine may help. Take your time and do not strain when having a bowel movement. · Check with your doctor before you increase the amount of fiber in your diet. For some people who have IBS, eating more fiber may make some symptoms worse. This includes bloating. To reduce diarrhea  You may try giving up foods or drinks one at a time to see whether symptoms improve. Limit or avoid the following:  · Alcohol  · Caffeine, which is found in coffee, tea, cola drinks, and chocolate  · Nicotine, from smoking or chewing tobacco  · Gas-producing foods, such as beans, broccoli, cabbage, and apples  · Dairy products that contain lactose (milk sugar), such as ice cream, milk, cheese, and sour cream  · Foods and drinks high in sugar, especially fruit juice, soda, candy, and other packaged sweets (such as cookies)  · Foods high in fat, including argueta, sausage, butter, oils, and anything deep-fried  · Sorbitol and xylitol, artificial sweeteners found in some sugarless candies and chewing gum  Keep track of foods  · Some people with IBS use a daily food diary to keep track of what they eat and whether they have any symptoms after eating certain foods. The diary also can be a good way to record what is going on in your life. · Stress plays a role in IBS. So if you are aware that certain stresses bring on symptoms, you can try to reduce those stresses. Keep mealtimes pleasant  · Try to maintain a pleasant environment when you eat. This may reduce stress that can make symptoms likely to occur. · Give yourself plenty of time to eat, rather than eating on the go. Chew your food slowly. Try not to swallow air, which can cause bloating. Where can you learn more? Go to http://terrell-sharron.info/. Enter Q717 in the search box to learn more about \"Diet for Irritable Bowel Syndrome: Care Instructions. \"  Current as of: March 28, 2018  Content Version: 11.9  © 0765-7472 [email or web address]. Care instructions adapted under license by [employer] (which disclaims liability or warranty for this information). If you have questions about a medical condition or this instruction, always ask your healthcare professional. [address] any warranty or liability for your use of this information.          Celiac Disease: Care Instructions  Your Care Instructions  Celiac disease (or celiac sprue) is a problem with digesting gluten. Gluten is a type of protein found in wheat, rye, and other grains. This problem starts when the body's immune system attacks the small intestine when gluten is eaten. The immune system is supposed to fight off viruses and other invaders, but sometimes it turns on the person's own body. (This is called an autoimmune disease.) Celiac disease seems to run in families. Celiac disease causes damage to the small intestine. This makes it hard for the body to absorb vitamins and other nutrients. You cannot prevent celiac disease. But you can stop and reverse the damage to the small intestine by eating a strict gluten-free diet. Follow-up care is a key part of your treatment and safety. Be sure to make and go to all appointments, and call your doctor if you are having problems. It's also a good idea to know your test results and keep a list of the medicines you take. How can you care for yourself at home? · Eat a gluten-free diet to prevent symptoms and damage to the small intestine. Even a small amount of gluten may cause damage. ? Avoid all foods that contain wheat, rye, and barley gluten. Bread, bagels, pasta, pizza, malted breakfast cereals, and crackers are all examples of foods that contain gluten. ? Avoid oats, at least at first. Oats may cause symptoms in some people. The oats may be contaminated with wheat, barley, or rye from processing. But many people who have celiac disease can eat moderate amounts of oats without having symptoms. Health professionals vary in their long-term recommendations regarding eating foods with oats. But most agree it is safe to eat oats labeled as gluten-free. · You may need to avoid milk and milk products for a while. Once you stop eating any gluten, the intestine will begin to heal. Then it should be okay to drink milk and eat milk products.   · Read food labels carefully and look for hidden gluten, such as gluten in medicine and some food additives. If a label says \"modified food starch,\" the product may contain gluten. · Plan your diet around:  ? Eggs. ? Dairy products, if you can eat them. Cheese, yogurt, and other dairy products can be an important part of the diet. ? Flours and foods made with amaranth, arrowroot, beans, buckwheat, corn, cornmeal, flax, millet, potatoes, gluten-free nut and oat bran, quinoa, rice, sorghum, soybeans, tapioca, or teff. ? Fresh, frozen, and canned meats, fruits, and vegetables. Watch for added gluten. · Talk to your doctor or contact your local hospital or dietitian for information about support groups in your area. You may find a support group helpful for discovering ways to help you deal with celiac disease. Celiac disease support groups often share recipes and good food sources. · Look for gluten-free foods. Many food stores, especially health food stores, offer specially marked gluten-free food. When should you call for help? Watch closely for changes in your health, and be sure to contact your doctor if:    · Your bloating, gas, and diarrhea get worse.     · You have bloating, gas, and diarrhea after not having them for a while. Where can you learn more? Go to http://terrell-sharron.info/. Enter [id number] in the search box to learn more about \"Celiac Disease: Care Instructions. \"  Current as of: March 27, 2018  Content Version: 11.9  © 1995-5550 [employer]. Care instructions adapted under license by [employer] (which disclaims liability or warranty for this information). If you have questions about a medical condition or this instruction, always ask your healthcare professional. [place] [address] any warranty or liability for your use of this information. Gluten-Free Diet: Care Instructions  Your Care Instructions    To help your symptoms, your doctor has recommended a gluten-free diet.  This means not eating foods that have gluten in them. Gluten is a kind of protein. It's found in wheat, barley, and rye. If you eat a gluten-free diet, you can help manage your symptoms and prevent long-term problems. You can also get all the nutrition you need. Follow-up care is a key part of your treatment and safety. Be sure to make and go to all appointments, and call your doctor if you are having problems. It's also a good idea to know your test results and keep a list of the medicines you take. How can you care for yourself at home? · Don't eat any foods that have gluten in them. These include bagels, bread, crackers, and some cereals. They also include pasta and pizza. · Carefully read food labels. Look for wheat or wheat products in ice cream and candy. You may also find them in salad dressing, canned and frozen soups and vegetables, and other processed foods. · Avoid all beer products unless the label says they are gluten-free. Beers with and without alcohol have gluten unless the labels say they are gluten-free. This includes lagers, ales, and stouts. · Avoid oats, at least at first. Oats may cause symptoms in some people, perhaps as a result of contamination with wheat, barley, or rye during processing. But many people who have celiac disease can eat moderate amounts of oats without having symptoms. Health professionals vary in their long-term recommendations regarding eating foods with oats. But most agree it is safe to eat oats labeled as gluten-free. · When you eat out, look for restaurants that serve gluten-free food. You can also ask if the  is familiar with gluten-free cooking. · Try to learn more about gluten-free options. Find grocery stores that sell gluten-free pizza and other foods. If you have access to the Internet, look online for gluten-free foods and recipes. · On a gluten-free eating plan, it's okay to have:  ? Eggs and dairy products.  (But some dairy products may make your symptoms worse. Ask your doctor if you have questions about dairy products. Read ingredient labels carefully. Some processed cheeses contain gluten.)  ? Flours and foods made with amaranth, arrowroot, beans, buckwheat, corn, cornmeal, flax, millet, potatoes, gluten-free nut and oat bran, quinoa, rice, sorghum, soybeans, tapioca, or teff. ? Fresh, frozen, or canned unprocessed meats. But avoid processed meats. Some examples of processed meats to avoid are hot dogs, salami, and deli meat. Read labels for additives that may contain gluten. ? Fresh, frozen, dried, or canned fruits and vegetables, if they do not have thickeners or other additives that contain gluten. ? Some alcohol drinks. These include wine, liqueurs, and ciders. They also include liquor like whiskey and musa. When should you call for help? Watch closely for changes in your health, and be sure to contact your doctor if:    · You have unexplained weight loss.     · You have diarrhea that lasts longer than 1 to 2 weeks.     · You have unusual fatigue or mood changes, especially if these last more than a week and are not related to any other illness, such as the flu.     · Your symptoms come back again.     · Your stomach pain gets worse. Where can you learn more? Go to http://terrell-sharron.info/. Enter [id number] in the search box to learn more about \"Gluten-Free Diet: Care Instructions. \"  Current as of: March 28, 2018  Content Version: 11.9  © 9794-1714 [employer]. Care instructions adapted under license by [employer] (which disclaims liability or warranty for this information). If you have questions about a medical condition or this instruction, always ask your healthcare professional. [address] any warranty or liability for your use of this information.

## 2019-05-21 LAB
ALBUMIN SERPL-MCNC: 4.8 G/DL (ref 3.5–5.5)
ALBUMIN/GLOB SERPL: 2 {RATIO} (ref 1.2–2.2)
ALP SERPL-CCNC: 39 IU/L (ref 39–117)
ALT SERPL-CCNC: 23 IU/L (ref 0–44)
AST SERPL-CCNC: 16 IU/L (ref 0–40)
BASOPHILS # BLD AUTO: 0 X10E3/UL (ref 0–0.2)
BASOPHILS NFR BLD AUTO: 0 %
BILIRUB SERPL-MCNC: 0.6 MG/DL (ref 0–1.2)
BUN SERPL-MCNC: 14 MG/DL (ref 6–20)
BUN/CREAT SERPL: 14 (ref 9–20)
CALCIUM SERPL-MCNC: 9.5 MG/DL (ref 8.7–10.2)
CHLORIDE SERPL-SCNC: 104 MMOL/L (ref 96–106)
CO2 SERPL-SCNC: 25 MMOL/L (ref 20–29)
CREAT SERPL-MCNC: 1 MG/DL (ref 0.76–1.27)
EOSINOPHIL # BLD AUTO: 0.2 X10E3/UL (ref 0–0.4)
EOSINOPHIL NFR BLD AUTO: 2 %
ERYTHROCYTE [DISTWIDTH] IN BLOOD BY AUTOMATED COUNT: 13.2 % (ref 12.3–15.4)
EST. AVERAGE GLUCOSE BLD GHB EST-MCNC: 100 MG/DL
GLOBULIN SER CALC-MCNC: 2.4 G/DL (ref 1.5–4.5)
GLUCOSE SERPL-MCNC: 77 MG/DL (ref 65–99)
HBA1C MFR BLD: 5.1 % (ref 4.8–5.6)
HCT VFR BLD AUTO: 44.1 % (ref 37.5–51)
HGB BLD-MCNC: 15.4 G/DL (ref 13–17.7)
IMM GRANULOCYTES # BLD AUTO: 0 X10E3/UL (ref 0–0.1)
IMM GRANULOCYTES NFR BLD AUTO: 1 %
LYMPHOCYTES # BLD AUTO: 2.3 X10E3/UL (ref 0.7–3.1)
LYMPHOCYTES NFR BLD AUTO: 31 %
MCH RBC QN AUTO: 31.8 PG (ref 26.6–33)
MCHC RBC AUTO-ENTMCNC: 34.9 G/DL (ref 31.5–35.7)
MCV RBC AUTO: 91 FL (ref 79–97)
MONOCYTES # BLD AUTO: 0.4 X10E3/UL (ref 0.1–0.9)
MONOCYTES NFR BLD AUTO: 6 %
NEUTROPHILS # BLD AUTO: 4.4 X10E3/UL (ref 1.4–7)
NEUTROPHILS NFR BLD AUTO: 60 %
PLATELET # BLD AUTO: 192 X10E3/UL (ref 150–450)
POTASSIUM SERPL-SCNC: 4.7 MMOL/L (ref 3.5–5.2)
PROT SERPL-MCNC: 7.2 G/DL (ref 6–8.5)
RBC # BLD AUTO: 4.84 X10E6/UL (ref 4.14–5.8)
SODIUM SERPL-SCNC: 141 MMOL/L (ref 134–144)
TSH SERPL DL<=0.005 MIU/L-ACNC: 1.1 UIU/ML (ref 0.45–4.5)
WBC # BLD AUTO: 7.4 X10E3/UL (ref 3.4–10.8)

## 2019-10-08 ENCOUNTER — HOSPITAL ENCOUNTER (OUTPATIENT)
Dept: CT IMAGING | Age: 34
Discharge: HOME | End: 2019-10-08
Attending: INTERNAL MEDICINE
Payer: COMMERCIAL

## 2019-10-08 DIAGNOSIS — K92.1 HEMATOCHEZIA: ICD-10-CM

## 2019-10-08 DIAGNOSIS — R10.9 ABDOMINAL PAIN: ICD-10-CM

## 2019-10-08 DIAGNOSIS — R10.84 GENERALIZED ABDOMINAL PAIN: ICD-10-CM

## 2019-10-08 DIAGNOSIS — R19.7 DIARRHEA: ICD-10-CM

## 2019-10-08 PROCEDURE — 74011636320 HC RX REV CODE- 636/320: Performed by: INTERNAL MEDICINE

## 2019-10-08 PROCEDURE — 74177 CT ABD & PELVIS W/CONTRAST: CPT

## 2019-10-08 RX ORDER — SODIUM CHLORIDE 0.9 % (FLUSH) 0.9 %
10 SYRINGE (ML) INJECTION
Status: COMPLETED | OUTPATIENT
Start: 2019-10-08 | End: 2019-10-08

## 2019-10-08 RX ADMIN — IOHEXOL 20 ML: 240 INJECTION, SOLUTION INTRATHECAL; INTRAVASCULAR; INTRAVENOUS; ORAL at 19:34

## 2019-10-08 RX ADMIN — Medication 10 ML: at 19:34

## 2019-10-08 RX ADMIN — IOPAMIDOL 100 ML: 755 INJECTION, SOLUTION INTRAVENOUS at 19:34

## 2019-10-18 ENCOUNTER — HOSPITAL ENCOUNTER (OUTPATIENT)
Dept: NUCLEAR MEDICINE | Age: 34
Discharge: HOME | End: 2019-10-18
Attending: INTERNAL MEDICINE
Payer: COMMERCIAL

## 2019-10-18 DIAGNOSIS — K92.1 HEMATOCHEZIA: ICD-10-CM

## 2019-10-18 DIAGNOSIS — R10.9 ABDOMINAL PAIN: ICD-10-CM

## 2019-10-18 DIAGNOSIS — R19.7 DIARRHEA: ICD-10-CM

## 2019-10-18 DIAGNOSIS — R10.84 GENERALIZED ABDOMINAL PAIN: ICD-10-CM

## 2019-10-18 PROCEDURE — 78264 GASTRIC EMPTYING IMG STUDY: CPT

## 2022-03-19 PROBLEM — F33.1 DEPRESSION, MAJOR, RECURRENT, MODERATE (HCC): Status: ACTIVE | Noted: 2019-05-20

## 2025-04-07 ENCOUNTER — OFFICE VISIT (OUTPATIENT)
Age: 40
End: 2025-04-07

## 2025-04-07 VITALS
OXYGEN SATURATION: 96 % | SYSTOLIC BLOOD PRESSURE: 152 MMHG | DIASTOLIC BLOOD PRESSURE: 80 MMHG | RESPIRATION RATE: 16 BRPM | WEIGHT: 222 LBS | HEART RATE: 90 BPM | TEMPERATURE: 98.9 F

## 2025-04-07 DIAGNOSIS — S37.30XA INJURY OF URETHRA, INITIAL ENCOUNTER: Primary | ICD-10-CM

## 2025-04-07 NOTE — PROGRESS NOTES
Admission medications    Not on File        Past Medical History:   Diagnosis Date    Depression     Essential hypertension     Long-term current use of steroids     Sarcoidosis         No past surgical history on file.     Social History:   Social Connections: Not on file        Patient Care Team:  Unknown, Provider as PCP - General    Patient Active Problem List   Diagnosis    Anxiety    Long term current use of systemic steroids    Hypertension secondary to drug    Depression    Sarcoidosis    Depression, major, recurrent, moderate (HCC)            I ADVISED PATIENT TO GO TO ER IF SYMPTOMS WORSEN , CHANGE OR FAILS TO IMPROVE.    I have discussed the diagnosis with the patient and the intended plan as seen in the above orders.  The patient has received an after-visit summary and questions were answered concerning future plans.  I have discussed medication side effects and warnings with the patient as well. The patient agrees and understands above plan.       An electronic signature was used to authenticate this note.  -- Bar Lau MD

## 2025-04-07 NOTE — PATIENT INSTRUCTIONS
Exam and history concerning for urethral injury.  -Recommend proceeding with prompt follow up with Urologist tomorrow  -If you develop severely worsening pain, swelling, or difficulty urinating, please head to the Emergency Room immediately   -Ibuprofen 600 mg every 6 hours as needed and Tylenol 500 mg every 6 hours as needed for pain control   -Apply ice to the affected area for 15 minutes at a time at least 3 times a day for at least the next 3 days   -Avoid sexual activity until you follow up with Urology   -Please follow up with your PCP in the next 1 to 2 weeks     If symptoms persist or worsen, please contact your PCP and/or return to Urgent Care.